# Patient Record
Sex: FEMALE | Race: WHITE | URBAN - METROPOLITAN AREA
[De-identification: names, ages, dates, MRNs, and addresses within clinical notes are randomized per-mention and may not be internally consistent; named-entity substitution may affect disease eponyms.]

---

## 2022-01-01 ENCOUNTER — OUTSIDE SERVICES (OUTPATIENT)
Dept: PRIMARY CARE CLINIC | Age: 87
End: 2022-01-01
Payer: COMMERCIAL

## 2022-01-01 DIAGNOSIS — B02.9 HERPES ZOSTER WITHOUT COMPLICATION: Primary | ICD-10-CM

## 2022-01-01 DIAGNOSIS — R41.89 UNRESPONSIVENESS: Primary | ICD-10-CM

## 2022-01-01 DIAGNOSIS — R41.82 ACUTE ALTERATION IN MENTAL STATUS: ICD-10-CM

## 2022-01-01 PROCEDURE — 99309 SBSQ NF CARE MODERATE MDM 30: CPT | Performed by: NURSE PRACTITIONER

## 2022-01-01 ASSESSMENT — VISUAL ACUITY: OU: 1

## 2022-04-07 NOTE — PROGRESS NOTES
22  Wilmar Walker : 1935 Sex: female  Age: 81 y.oJose Luis Peters is seen today, an acute visit per request of staff, for complaints of a pustular rash noted to bilateral buttocks, by staff. Staff reports that she has not complained of pain, just that the areas itch. They report that they have noted some crusting to the outsides of rash, but still have clear pustules noted. She denies any other complaints today and staff reports that they are not aware of any acute issues with her. They deny any combative, disruptive, or aggressive behaviors from her. Review of Systems   Unable to perform ROS: Mental status change       Physical Exam  Vitals and nursing note reviewed. Constitutional:       General: She is awake. She is not in acute distress. Appearance: Normal appearance. She is well-developed. She is not ill-appearing, toxic-appearing or diaphoretic. HENT:      Head: Normocephalic and atraumatic. Right Ear: External ear normal. Decreased hearing noted. There is no impacted cerumen. Left Ear: External ear normal. Decreased hearing noted. There is no impacted cerumen. Nose: Nose normal. No congestion or rhinorrhea. Mouth/Throat:      Lips: Pink. No lesions. Mouth: Mucous membranes are moist.      Pharynx: Oropharynx is clear. No oropharyngeal exudate or posterior oropharyngeal erythema. Eyes:      General: Lids are normal. Vision grossly intact. Gaze aligned appropriately. No scleral icterus. Right eye: No discharge. Left eye: No discharge. Extraocular Movements: Extraocular movements intact. Conjunctiva/sclera: Conjunctivae normal.      Right eye: Right conjunctiva is not injected. Left eye: Left conjunctiva is not injected. Pupils: Pupils are equal, round, and reactive to light. Neck:      Thyroid: No thyromegaly. Vascular: No carotid bruit.       Trachea: Trachea normal.   Cardiovascular:      Rate and Rhythm: Normal rate and regular rhythm. Occasional extrasystoles are present. Pulses: Normal pulses. Heart sounds: S1 normal and S2 normal. Murmur heard. Systolic murmur is present with a grade of 1/6. No friction rub. No gallop. Pulmonary:      Effort: Pulmonary effort is normal. No tachypnea, accessory muscle usage or respiratory distress. Breath sounds: Normal breath sounds and air entry. No stridor. No wheezing, rhonchi or rales. Chest:      Chest wall: No tenderness. Abdominal:      General: Bowel sounds are normal. There is no distension. Palpations: Abdomen is soft. There is no mass. Tenderness: There is no abdominal tenderness. There is no right CVA tenderness, left CVA tenderness, guarding or rebound. Hernia: No hernia is present. Musculoskeletal:         General: No swelling, tenderness, deformity or signs of injury. Normal range of motion. Cervical back: Full passive range of motion without pain, normal range of motion and neck supple. No rigidity. No muscular tenderness. Right lower leg: No edema. Left lower leg: No edema. Legs:    Lymphadenopathy:      Cervical: No cervical adenopathy. Skin:     General: Skin is warm and dry. Capillary Refill: Capillary refill takes less than 2 seconds. Coloration: Skin is not jaundiced or pale. Findings: Rash present. No bruising, erythema or lesion. Rash is crusting and pustular (To buttocks). Neurological:      General: No focal deficit present. Mental Status: She is alert. Mental status is at baseline. She is confused. Cranial Nerves: Cranial nerves are intact. No cranial nerve deficit. Sensory: Sensation is intact. No sensory deficit. Motor: Weakness present. Coordination: Coordination is intact.  Coordination normal.      Gait: Gait abnormal.      Deep Tendon Reflexes: Reflexes normal.   Psychiatric:         Attention and Perception: Attention and perception normal.         Mood and Affect: Mood normal. Affect is flat. Speech: Speech normal.         Behavior: Behavior normal. Behavior is cooperative. Thought Content: Thought content normal.         Cognition and Memory: Cognition is impaired. Memory is impaired. Assessment and Plan:  Raoul Mcgowan was seen today for herpes zoster. Diagnoses and all orders for this visit:    Herpes zoster without complication        Discussions/Education provided to patients during visit:  [] Discussed the importance to stop smoking. [x] Advised to monitor eating habits. [] Reviewed and discussed Imaging results. [] Reviewed and discussed Lab results. [x] Discussed the importance of drinking plenty of fluids. [] Cut down on Salt, Caffeine, and Sugar. [x] Continue Medications as Discussed. [x] Communicated with patient any concerns, to phone office. Orders written: Valacyclovir 1000 mg p.o. 3 times daily x7 days. Urinalysis with culture and sensitivity. Return in about 1 week (around 4/14/2022).       Seen By:  SHIRLEY Rucker NP

## 2022-04-14 NOTE — PROGRESS NOTES
22  Naty Cardoso : 1935 Sex: female  Age: 81 y.o. Whit Calvert is seen today on an acute basis, per request of staff. They noted that she has had a rapid decline in her overall status. They report that she is now unresponsive and does not respond to painful stimuli. Staff reports that family is at bedside. I walked in the room and found family sitting at her bedside. I did discuss with them the acute change that she had and her overall status and spoke with them regarding her wishes for her further care. They were all in agreement to say that they wanted her to have comfort care measures only. I did advise him that we would be able to give her some medication that would be sure that she was not in pain, was not having air hunger, and was not feeling anxious or agitated. They were in complete agreement and stated that that is what they wanted to go forth with. They reported her wishes were to not have a long, drawn out, sustained life on artificial interventions. We will contact hospice for a referral and consultation. Review of Systems   Unable to perform ROS: Patient unresponsive       Physical Exam  Vitals and nursing note reviewed. Constitutional:       General: She is not in acute distress. Appearance: Normal appearance. She is well-developed. She is not ill-appearing, toxic-appearing or diaphoretic. Interventions: Face mask in place. Comments: CPAP   HENT:      Head: Normocephalic and atraumatic. Right Ear: External ear normal.      Left Ear: External ear normal.      Mouth/Throat:      Mouth: Mucous membranes are pale and dry. Eyes:      General: Lids are normal. No scleral icterus. Right eye: No discharge. Left eye: No discharge. Conjunctiva/sclera:      Right eye: Right conjunctiva is not injected. Left eye: Left conjunctiva is not injected. Pupils: Pupils are equal, round, and reactive to light.    Neck:      Trachea: Trachea normal.   Cardiovascular:      Rate and Rhythm: Normal rate and regular rhythm. Occasional extrasystoles are present. Pulses: Normal pulses. Heart sounds: S1 normal and S2 normal. Murmur heard. Systolic murmur is present with a grade of 1/6. No friction rub. No gallop. Pulmonary:      Effort: Tachypnea and respiratory distress (Currently wearing CPAP) present. No accessory muscle usage. Breath sounds: No stridor. No wheezing, rhonchi or rales. Chest:      Chest wall: No tenderness. Abdominal:      General: Bowel sounds are normal. There is no distension. Palpations: Abdomen is soft. There is no mass. Tenderness: There is no abdominal tenderness. There is no right CVA tenderness, left CVA tenderness, guarding or rebound. Hernia: No hernia is present. Musculoskeletal:         General: No swelling, tenderness, deformity or signs of injury. Normal range of motion. Cervical back: No muscular tenderness. Right lower leg: No edema. Left lower leg: No edema. Skin:     General: Skin is warm and dry. Capillary Refill: Capillary refill takes less than 2 seconds. Coloration: Skin is pale. Skin is not jaundiced. Findings: No bruising, erythema, lesion or rash. Neurological:      General: No focal deficit present. Mental Status: She is unresponsive. Cranial Nerves: No cranial nerve deficit. Sensory: No sensory deficit. Motor: No weakness. Coordination: Coordination normal.      Gait: Gait normal.      Deep Tendon Reflexes: Reflexes normal.   Psychiatric:         Speech: She is noncommunicative. Assessment and Plan:  Germán Peters was seen today for other. Diagnoses and all orders for this visit:    Unresponsiveness    Acute alteration in mental status        Discussions/Education provided to patients during visit:  [] Discussed the importance to stop smoking. [] Advised to monitor eating habits.   [] Reviewed and discussed Imaging results. [] Reviewed and discussed Lab results. [] Discussed the importance of drinking plenty of fluids. [] Cut down on Salt, Caffeine, and Sugar.  [] Continue Medications as Discussed. [x] Communicated with family any concerns, to advise staff. Orders written: Roxanol 20 mg/ML -5 mg sublingual every 2 as needed pain and air hunger and Ativan 2 mg/ML -0.5 mg sublingual every 4 as needed agitation and anxiety    Return in about 1 week (around 4/21/2022) for Reassess condition, Review of chronic conditions.       Seen By:  SHIRLEY Estrella NP